# Patient Record
Sex: MALE | Race: WHITE | NOT HISPANIC OR LATINO | Employment: FULL TIME | URBAN - METROPOLITAN AREA
[De-identification: names, ages, dates, MRNs, and addresses within clinical notes are randomized per-mention and may not be internally consistent; named-entity substitution may affect disease eponyms.]

---

## 2021-09-30 ENCOUNTER — HOSPITAL ENCOUNTER (EMERGENCY)
Facility: CLINIC | Age: 36
Discharge: HOME | End: 2021-09-30
Attending: PHYSICIAN ASSISTANT | Admitting: PHYSICIAN ASSISTANT
Payer: COMMERCIAL

## 2021-09-30 VITALS
RESPIRATION RATE: 18 BRPM | SYSTOLIC BLOOD PRESSURE: 165 MMHG | HEART RATE: 100 BPM | OXYGEN SATURATION: 99 % | DIASTOLIC BLOOD PRESSURE: 102 MMHG | WEIGHT: 170 LBS | TEMPERATURE: 97 F

## 2021-09-30 DIAGNOSIS — Z20.2 EXPOSURE TO CHLAMYDIA: ICD-10-CM

## 2021-09-30 PROCEDURE — 99284 EMERGENCY DEPT VISIT MOD MDM: CPT | Performed by: PHYSICIAN ASSISTANT

## 2021-09-30 PROCEDURE — 99283 EMERGENCY DEPT VISIT LOW MDM: CPT | Performed by: PHYSICIAN ASSISTANT

## 2021-09-30 PROCEDURE — 87591 N.GONORRHOEAE DNA AMP PROB: CPT | Performed by: PHYSICIAN ASSISTANT

## 2021-09-30 PROCEDURE — 87491 CHLMYD TRACH DNA AMP PROBE: CPT | Performed by: PHYSICIAN ASSISTANT

## 2021-09-30 RX ORDER — DOXYCYCLINE 100 MG/1
100 CAPSULE ORAL EVERY 12 HOURS
Qty: 14 CAPSULE | Refills: 0 | Status: SHIPPED | OUTPATIENT
Start: 2021-09-30 | End: 2023-08-04

## 2021-10-01 ENCOUNTER — TELEPHONE (OUTPATIENT)
Dept: EMERGENCY MEDICINE | Facility: CLINIC | Age: 36
End: 2021-10-01

## 2021-10-01 LAB
C TRACH DNA SPEC QL NAA+PROBE: POSITIVE
N GONORRHOEA DNA SPEC QL NAA+PROBE: NEGATIVE

## 2021-10-01 NOTE — RESULT ENCOUNTER NOTE
Final N. Gonorrhoeae PCR is NEGATIVE.   No treatment or change in treatment per [facility] Lab Result N. Gonorrhea protocol.

## 2021-10-01 NOTE — ED PROVIDER NOTES
History     Chief Complaint   Patient presents with     Exposure to STD     HPI  Reg Nieves is a 35 year old male who presents for evaluation of positive chlamydia exposure.  Reports that his girlfriend of the past 4 months tested positive for chlamydia 2 days ago.  He denies any dysuria, frequency, urgency, flank pain, gross hematuria, urethral discharge, genital rash, penile pain, scrotal discomfort, nausea, vomiting, or abdominal pain.  Has never tested positive for an STD other than HPV 10 years ago.  Denies any ongoing genital lesions.  Has been in this relationship for the past 4 months.  He questions if maybe he had a positive exposure prior to this relationship.  Does admit to low back pain for the past 1 year, but has been better for the past 1 month.        Allergies:  No Known Allergies    Problem List:    There are no problems to display for this patient.       Past Medical History:    No past medical history on file.    Past Surgical History:    No past surgical history on file.    Family History:    No family history on file.    Social History:  Marital Status:  Single [1]  Social History     Tobacco Use     Smoking status: Current Every Day Smoker   Substance Use Topics     Alcohol use: Not on file     Drug use: Not on file        Medications:    doxycycline monohydrate (MONODOX) 100 MG capsule  NO ACTIVE MEDICATIONS  podofilox (CONDYLOX) 0.5 % external solution          Review of Systems   All other systems reviewed and are negative.      Physical Exam   BP: (!) 175/104  Pulse: 100  Temp: 97  F (36.1  C)  Resp: 18  Weight: 77.1 kg (170 lb)  SpO2: 99 %      Physical Exam  Vitals and nursing note reviewed.   Constitutional:       General: He is not in acute distress.     Appearance: He is not diaphoretic.   HENT:      Head: Normocephalic and atraumatic.      Right Ear: External ear normal.      Left Ear: External ear normal.      Nose: Nose normal.      Mouth/Throat:      Pharynx: No oropharyngeal  exudate.   Eyes:      General: No scleral icterus.        Right eye: No discharge.         Left eye: No discharge.      Conjunctiva/sclera: Conjunctivae normal.      Pupils: Pupils are equal, round, and reactive to light.   Neck:      Thyroid: No thyromegaly.   Cardiovascular:      Rate and Rhythm: Normal rate and regular rhythm.      Heart sounds: Normal heart sounds. No murmur heard.     Pulmonary:      Effort: Pulmonary effort is normal. No respiratory distress.      Breath sounds: Normal breath sounds. No wheezing or rales.   Chest:      Chest wall: No tenderness.   Abdominal:      General: Bowel sounds are normal. There is no distension.      Palpations: Abdomen is soft. There is no mass.      Tenderness: There is no abdominal tenderness. There is no guarding or rebound.   Genitourinary:     Penis: Normal.       Testes: Normal.   Musculoskeletal:         General: No tenderness or deformity. Normal range of motion.      Cervical back: Normal range of motion and neck supple.   Lymphadenopathy:      Cervical: No cervical adenopathy.   Skin:     General: Skin is warm and dry.      Capillary Refill: Capillary refill takes less than 2 seconds.      Findings: No erythema or rash.   Neurological:      Mental Status: He is alert and oriented to person, place, and time.      Cranial Nerves: No cranial nerve deficit.   Psychiatric:         Behavior: Behavior normal.         Thought Content: Thought content normal.         ED Course        Procedures              Critical Care time:  none               No results found for this or any previous visit (from the past 24 hour(s)).    Medications - No data to display    Assessments & Plan (with Medical Decision Making)  Exposure to chlamydia     35 year old male presents for evaluation of chlamydia exposure through his girlfriend.  He does not have any symptoms.  See HPI above for details.  On exam he is afebrile with temperature of 97.0.  Exam is normal.  Urine screening  pending.  Treatment with doxycycline per orders.  Possible side effects of medication discussed.  He will monitor MyChart for results.  I offered him hepatitis C, syphilis, and HIV screening.  He did not feel that this was necessary.  Encouraged him to follow-up with primary care in the event that he develops any symptoms or if he changes his mind about further screening.  Patient was in agreement this plan and was suitable for discharge.     I have reviewed the nursing notes.    I have reviewed the findings, diagnosis, plan and need for follow up with the patient.       New Prescriptions    DOXYCYCLINE MONOHYDRATE (MONODOX) 100 MG CAPSULE    Take 1 capsule (100 mg) by mouth every 12 hours       Final diagnoses:   Exposure to chlamydia     Disclaimer: This note consists of symbols derived from keyboarding, dictation and/or voice recognition software. As a result, there may be errors in the script that have gone undetected. Please consider this when interpreting information found in this chart.      9/30/2021   [facility] EMERGENCY DEPT     Mariusz Garcia PA-C  09/30/21 1787

## 2021-10-01 NOTE — TELEPHONE ENCOUNTER
[facility] Emergency Department/Urgent Care Lab result notification:    [place] ED lab result protocol used  Chlamydia T    Reason for call  Notify of lab results, assess symptoms,  review ED providers recommendations/discharge instructions (if necessary) and advise per ED lab result f/u protocol    Lab Result   Final Chlamydia trachomatis PCR on 10/1/21 is POSITIVE for C. trachomatis rRNA by transcription mediated amplification.   Antibiotic's administered while Patient in the [facility] Emergency Dept [if applicable]:  NA  [facility] ED discharge antibiotic[if applicable]: Doxycycline 100 mg PO tablet, 1 tablet (100 mg) by mouth 2 times daily for 7 days  Recommendations in treatment per [facility] ED Lab result Chlamydia trachomatis protocol.     Information table from Emergency Dept Provider visit on 9/30/21  Symptoms reported at ED visit (Chief complaint, HPI) Chief Complaint   Patient presents with     Exposure to STD      HPI  Reg Nieves is a 35 year old male who presents for evaluation of positive chlamydia exposure.  Reports that his girlfriend of the past 4 months tested positive for chlamydia 2 days ago.  He denies any dysuria, frequency, urgency, flank pain, gross hematuria, urethral discharge, genital rash, penile pain, scrotal discomfort, nausea, vomiting, or abdominal pain.  Has never tested positive for an STD other than HPV 10 years ago.  Denies any ongoing genital lesions.  Has been in this relationship for the past 4 months.  He questions if maybe he had a positive exposure prior to this relationship.  Does admit to low back pain for the past 1 year, but has been better for the past 1 month.     ED providers Impression and Plan (applicable information) Exposure to chlamydia      35 year old male presents for evaluation of chlamydia exposure through his girlfriend.  He does not have any symptoms.  See HPI above for details.  On exam he is afebrile with temperature of  97.0.  Exam is normal.  Urine screening pending.  Treatment with doxycycline per orders.  Possible side effects of medication discussed.  He will monitor MyChart for results.  I offered him hepatitis C, syphilis, and HIV screening.  He did not feel that this was necessary.  Encouraged him to follow-up with primary care in the event that he develops any symptoms or if he changes his mind about further screening.  Patient was in agreement this plan and was suitable for discharge.   Miscellaneous information NA     RN Assessment (Patient s current Symptoms), include time called.  [Insert Left message here if message left]  Patient was asymptomatic when seen in the ED    RN Recommendations/Instructions per [place] ED lab result protocol  Patient notified of lab result and treatment recommendations.   He has started the Doxycycline  STD Patient Instructions reviewed:    We recommend that you contact any recent sexual partners within the last 2 months and have them evaluated by a physician.    Avoid sexual activity for 7 to 10 days or until both you and your partner(s) have completed all antibiotic medications.    We advise that you consider following up with your PCP at approximately 3 months for retesting to be sure the infection has cleared.    PCP follow-up Questions asked: YES       Dante White RN  [facility]  Emergency Dept Lab Result RN  Ph# [phone]     Copy of Lab result   Component      Latest Ref Rng & Units 9/30/2021   N Gonorrhea PCR      Negative Negative   Chlamydia Trachomatis PCR      Negative Positive (A)

## 2021-10-01 NOTE — DISCHARGE INSTRUCTIONS
It was a pleasure working with you today!  I hope your condition improves rapidly!     Please take the doxycycline as prescribed twice daily for 7 days.  Please be careful with sun exposure while on this antibiotic, as you can burn much easier.  Your results should return in the next 3-4 days.  Please monitor MyChart for these results.  You were treated for presumed chlamydia.

## 2021-10-01 NOTE — RESULT ENCOUNTER NOTE
Final Chlamydia trachomatis PCR on 10/1/21 is POSITIVE for C. trachomatis rRNA by transcription mediated amplification.   Antibiotic's administered while Patient in the [facility] Emergency Dept [if applicable]:  NA  [facility] ED discharge antibiotic[if applicable]: Doxycycline 100 mg PO tablet, 1 tablet (100 mg) by mouth 2 times daily for 7 days  Recommendations in treatment per [facility] ED Lab result Chlamydia trachomatis protocol.

## 2021-11-12 ENCOUNTER — HOSPITAL ENCOUNTER (EMERGENCY)
Facility: CLINIC | Age: 36
Discharge: HOME | End: 2021-11-12
Attending: FAMILY MEDICINE | Admitting: FAMILY MEDICINE
Payer: COMMERCIAL

## 2021-11-12 VITALS
DIASTOLIC BLOOD PRESSURE: 91 MMHG | OXYGEN SATURATION: 99 % | HEART RATE: 103 BPM | TEMPERATURE: 98.7 F | SYSTOLIC BLOOD PRESSURE: 143 MMHG | WEIGHT: 168 LBS | RESPIRATION RATE: 20 BRPM

## 2021-11-12 DIAGNOSIS — Z20.822 SUSPECTED 2019 NOVEL CORONAVIRUS INFECTION: ICD-10-CM

## 2021-11-12 LAB
FLUAV RNA SPEC QL NAA+PROBE: NEGATIVE
FLUBV RNA RESP QL NAA+PROBE: NEGATIVE
SARS-COV-2 RNA RESP QL NAA+PROBE: NEGATIVE

## 2021-11-12 PROCEDURE — 99283 EMERGENCY DEPT VISIT LOW MDM: CPT

## 2021-11-12 PROCEDURE — 99282 EMERGENCY DEPT VISIT SF MDM: CPT | Performed by: FAMILY MEDICINE

## 2021-11-12 PROCEDURE — C9803 HOPD COVID-19 SPEC COLLECT: HCPCS

## 2021-11-12 PROCEDURE — 87636 SARSCOV2 & INF A&B AMP PRB: CPT | Performed by: FAMILY MEDICINE

## 2021-11-12 NOTE — ED TRIAGE NOTES
Pt reports a fever, cough, sore throat and some dizziness the past couple days. States he is feeling better with only a scratchy throat at this time. States he is here for a covid test so he can go back to work.

## 2021-11-12 NOTE — ED PROVIDER NOTES
History     Chief Complaint   Patient presents with     Pharyngitis     HPI  Reg Nivees is a 36 year old male who presents with concerns of possible COVID-19. Patient's had a cough, sore throat and fevers over the last few days, although he is feeling a little bit better today. Patient needs a note so that he can return to work. Denies any shortness of breath. Denies any chest pain. Nothing makes his symptoms better or worse.    Allergies:  No Known Allergies    Problem List:    There are no problems to display for this patient.       Past Medical History:    History reviewed. No pertinent past medical history.    Past Surgical History:    History reviewed. No pertinent surgical history.    Family History:    History reviewed. No pertinent family history.    Social History:  Marital Status:  Single [1]  Social History     Tobacco Use     Smoking status: Former Smoker     Smokeless tobacco: Never Used   Substance Use Topics     Alcohol use: Not Currently     Drug use: Never        Medications:    doxycycline monohydrate (MONODOX) 100 MG capsule  NO ACTIVE MEDICATIONS  podofilox (CONDYLOX) 0.5 % external solution          Review of Systems   All other systems reviewed and are negative.      Physical Exam   BP: (!) 143/91  Pulse: 103  Temp: 98.7  F (37.1  C)  Resp: 20  Weight: 76.2 kg (168 lb)  SpO2: 99 %      Physical Exam  Vitals and nursing note reviewed.   Constitutional:       General: He is not in acute distress.     Appearance: He is well-developed. He is not diaphoretic.   HENT:      Head: Normocephalic and atraumatic.   Eyes:      Conjunctiva/sclera: Conjunctivae normal.   Cardiovascular:      Rate and Rhythm: Normal rate and regular rhythm.      Heart sounds: Normal heart sounds. No murmur heard.      Pulmonary:      Effort: Pulmonary effort is normal. No respiratory distress.      Breath sounds: Normal breath sounds. No stridor. No wheezing.   Abdominal:      General: Bowel sounds are normal. There is no  distension.      Palpations: Abdomen is soft.      Tenderness: There is no abdominal tenderness. There is no guarding.   Musculoskeletal:         General: Normal range of motion.      Cervical back: Normal range of motion.   Skin:     General: Skin is warm and dry.      Findings: No rash.   Neurological:      Mental Status: He is alert and oriented to person, place, and time.   Psychiatric:         Judgment: Judgment normal.         ED Course        Procedures      No results found for this or any previous visit (from the past 24 hour(s)).    Medications - No data to display     Exam was unremarkable. We will do a Covid test and patient will quarantine at home till results come back hopefully later on today. Patient will be discharged at this time.    Assessments & Plan (with Medical Decision Making)  Suspected COVID-19 infection     I have reviewed the nursing notes.    I have reviewed the findings, diagnosis, plan and need for follow up with the patient.              11/12/2021   [facility] EMERGENCY DEPT     Fernando Liriano MD  11/12/21 0900

## 2021-12-16 ENCOUNTER — HOSPITAL ENCOUNTER (EMERGENCY)
Facility: CLINIC | Age: 36
Discharge: HOME | End: 2021-12-16
Attending: FAMILY MEDICINE | Admitting: FAMILY MEDICINE
Payer: COMMERCIAL

## 2021-12-16 VITALS
WEIGHT: 170 LBS | RESPIRATION RATE: 18 BRPM | HEIGHT: 72 IN | DIASTOLIC BLOOD PRESSURE: 103 MMHG | TEMPERATURE: 98.4 F | BODY MASS INDEX: 23.03 KG/M2 | HEART RATE: 88 BPM | SYSTOLIC BLOOD PRESSURE: 157 MMHG | OXYGEN SATURATION: 99 %

## 2021-12-16 DIAGNOSIS — R68.89 INFLUENZA-LIKE SYMPTOMS: ICD-10-CM

## 2021-12-16 PROCEDURE — 99284 EMERGENCY DEPT VISIT MOD MDM: CPT | Performed by: FAMILY MEDICINE

## 2021-12-16 PROCEDURE — 99282 EMERGENCY DEPT VISIT SF MDM: CPT | Performed by: FAMILY MEDICINE

## 2021-12-16 RX ORDER — ACETAMINOPHEN 500 MG
500-1000 TABLET ORAL EVERY 6 HOURS PRN
Refills: 0 | COMMUNITY
Start: 2021-12-16 | End: 2021-12-20

## 2021-12-16 RX ORDER — OSELTAMIVIR PHOSPHATE 75 MG/1
75 CAPSULE ORAL 2 TIMES DAILY
Qty: 10 CAPSULE | Refills: 0 | Status: SHIPPED | OUTPATIENT
Start: 2021-12-16 | End: 2021-12-21

## 2021-12-16 RX ORDER — IBUPROFEN 200 MG
600 TABLET ORAL EVERY 6 HOURS PRN
Refills: 0 | COMMUNITY
Start: 2021-12-16 | End: 2021-12-19

## 2021-12-16 ASSESSMENT — ENCOUNTER SYMPTOMS
COUGH: 1
FATIGUE: 1
SHORTNESS OF BREATH: 0
PSYCHIATRIC NEGATIVE: 1
SORE THROAT: 1
CARDIOVASCULAR NEGATIVE: 1
GASTROINTESTINAL NEGATIVE: 1
CHILLS: 1
HEMATOLOGIC/LYMPHATIC NEGATIVE: 1
MYALGIAS: 1
EYES NEGATIVE: 1
FEVER: 0
NEUROLOGICAL NEGATIVE: 1

## 2021-12-16 ASSESSMENT — MIFFLIN-ST. JEOR: SCORE: 1729.11

## 2021-12-16 NOTE — LETTER
[facility]  Emergency Room  [address] [place]   [address]  Tel: [phone]   Fax: [phone]  2021    Reg Nieves  [address]  [facility] [facility] [place] [address]  [phone] (home)     : 1985          To Whom it May Concern:    Reg Nieves was seen in our ER today 2021. He has been diagnosed with influenza and is considered contagious at this time.  I expect his condition to improve over the next 3-7 days.  He may return to work/school, without restriction, when improved and without fever for more than 24 hours. If not improved during the above expected time period,  then I have encouraged him to be rechecked in his clinic for further evaluation.      Please contact me for questions or concerns.    Sincerely,      Jimi Lopez, DO  Physician  [facility] Emergency Room

## 2021-12-17 NOTE — ED PROVIDER NOTES
History     Chief Complaint   Patient presents with     Influenza     HPI  Reg Nieves is a 36 year old male who presents to the ER with concerns about likely influenza infection.  Patient states that over the last 2 days he has developed nasal congestion, cough, and sore throat symptoms.  Mild body aches as well.  He has not noticed much of a fever as yet.  He states that 3 of his 8 children have tested positive for influenza and are currently ill at home.  He is concerned that he needs a note for work so as not to spread to his coworkers.  He states he has not been immunized for anything since he was a child that he is aware of.  He denies feeling significantly short of breath.  He has had no nausea or vomiting symptoms to this point.        I reviewed the following nurse triage note:  Pt states 3 days ago developed nasal congestion, sore throat, nausea, HA. Denies SOA or other sx. 3 of the patient's 8 children are influenza positive. All 3 have tested positive within the last 3 days. COVID negative. Pt states solely here for influenza testing this evening.      Allergies:  No Known Allergies    Problem List:    There are no problems to display for this patient.       Past Medical History:    No past medical history on file.    Past Surgical History:    No past surgical history on file.    Family History:    No family history on file.    Social History:  Marital Status:  Single [1]  Social History     Tobacco Use     Smoking status: Former Smoker     Smokeless tobacco: Never Used   Substance Use Topics     Alcohol use: Not Currently     Drug use: Never        Medications:    doxycycline monohydrate (MONODOX) 100 MG capsule  NO ACTIVE MEDICATIONS  podofilox (CONDYLOX) 0.5 % external solution          Review of Systems   Constitutional: Positive for chills and fatigue. Negative for fever.   HENT: Positive for congestion and sore throat.    Eyes: Negative.    Respiratory: Positive for cough. Negative for shortness  of breath.    Cardiovascular: Negative.    Gastrointestinal: Negative.    Genitourinary: Negative.    Musculoskeletal: Positive for myalgias.   Skin: Negative.    Neurological: Negative.    Hematological: Negative.    Psychiatric/Behavioral: Negative.    All other systems reviewed and are negative.      Physical Exam   BP: (!) 157/103  Pulse: 88  Temp: 98.4  F (36.9  C)  Resp: 18  Height: 182.9 cm (6')  Weight: 77.1 kg (170 lb)  SpO2: 99 %      Physical Exam  Vitals and nursing note reviewed.   Constitutional:       General: He is not in acute distress.     Appearance: He is ill-appearing. He is not toxic-appearing or diaphoretic.   HENT:      Head: Normocephalic and atraumatic.      Nose: Congestion present.      Mouth/Throat:      Mouth: Mucous membranes are moist.      Pharynx: Posterior oropharyngeal erythema present. No oropharyngeal exudate.   Eyes:      General: No scleral icterus.     Extraocular Movements: Extraocular movements intact.      Conjunctiva/sclera: Conjunctivae normal.      Pupils: Pupils are equal, round, and reactive to light.   Cardiovascular:      Rate and Rhythm: Normal rate.      Pulses: Normal pulses.   Pulmonary:      Effort: Pulmonary effort is normal. No respiratory distress.      Breath sounds: Normal breath sounds.   Musculoskeletal:         General: No swelling. Normal range of motion.      Cervical back: Normal range of motion and neck supple.   Skin:     Capillary Refill: Capillary refill takes less than 2 seconds.      Findings: No rash.   Neurological:      Mental Status: He is alert and oriented to person, place, and time.   Psychiatric:         Mood and Affect: Mood normal.         Behavior: Behavior normal.         ED Course                 Procedures              Critical Care time:  none               Assessments & Plan (with Medical Decision Making)  36-year-old male to the ER secondary concerns of symptoms of upper starter infection with exposure to 3 of his children  positive for influenza A with recent diagnosis.  Patient's exam findings consistent with upper respiratory viral type infection.  Have elected just to treat the patient given the history for likely influenza infection and he was prescribed Tamiflu.  Patient was also given a note for his employer.  Patient was instructed on signs and symptoms of concern with influenza-like illness and when to return to the ER if he has increase or worsening symptoms.  A handout was also sent home with him describing influenza and symptoms of concern.  Note was generated for his employer.  To return to the ER for increase or worsening symptoms as needed.     I have reviewed the nursing notes.    I have reviewed the findings, diagnosis, plan and need for follow up with the patient.       New Prescriptions    ACETAMINOPHEN (TYLENOL) 500 MG TABLET    Take 1-2 tablets (500-1,000 mg) by mouth every 6 hours as needed for fever or pain    IBUPROFEN (ADVIL/MOTRIN) 200 MG TABLET    Take 3 tablets (600 mg) by mouth every 6 hours as needed for fever (TAKE WITH FOOD) TAKE WITH FOOD AS NEEDED FOR PAIN    OSELTAMIVIR (TAMIFLU) 75 MG CAPSULE    Take 1 capsule (75 mg) by mouth 2 times daily for 5 days            I verbally discussed the findings of the evaluation today in the ER. I have verbally discussed with Reg the suggested treatment(s) as described in the discharge instructions and handouts. I have prescribed the above listed medications and instructed him on appropriate use of these medications.      I have verbally suggested he follow-up in his clinic or return to the ER for increased symptoms. See the follow-up recommendations documented  in the after visit summary in this visit's EPIC chart.    Final diagnoses:   Influenza-like symptoms       12/16/2021   [facility] EMERGENCY DEPT     Jimi Lopez,   12/16/21 2032

## 2021-12-17 NOTE — ED TRIAGE NOTES
Pt states 3 days ago developed nasal congestion, sore throat, nausea, HA. Denies SOA or other sx. 3 of the patient's 8 children are influenza positive. All 3 have tested positive within the last 3 days. COVID negative. Pt states solely here for influenza testing this evening.

## 2021-12-18 ENCOUNTER — HEALTH MAINTENANCE LETTER (OUTPATIENT)
Age: 36
End: 2021-12-18

## 2022-01-03 ENCOUNTER — HOSPITAL ENCOUNTER (EMERGENCY)
Facility: CLINIC | Age: 37
Discharge: HOME | End: 2022-01-03
Attending: EMERGENCY MEDICINE | Admitting: EMERGENCY MEDICINE
Payer: COMMERCIAL

## 2022-01-03 VITALS
DIASTOLIC BLOOD PRESSURE: 114 MMHG | HEART RATE: 105 BPM | SYSTOLIC BLOOD PRESSURE: 139 MMHG | HEIGHT: 72 IN | RESPIRATION RATE: 20 BRPM | OXYGEN SATURATION: 97 % | WEIGHT: 174.8 LBS | BODY MASS INDEX: 23.68 KG/M2

## 2022-01-03 DIAGNOSIS — Z20.822 EXPOSURE TO 2019 NOVEL CORONAVIRUS: ICD-10-CM

## 2022-01-03 PROCEDURE — 87636 SARSCOV2 & INF A&B AMP PRB: CPT | Performed by: EMERGENCY MEDICINE

## 2022-01-03 PROCEDURE — C9803 HOPD COVID-19 SPEC COLLECT: HCPCS | Performed by: EMERGENCY MEDICINE

## 2022-01-03 PROCEDURE — 99283 EMERGENCY DEPT VISIT LOW MDM: CPT | Performed by: EMERGENCY MEDICINE

## 2022-01-03 ASSESSMENT — MIFFLIN-ST. JEOR: SCORE: 1750.89

## 2022-01-03 NOTE — ED NOTES
Discussed pt's high blood pressure talked about contributing factors and risks.  Discussed decreasing caffeine use, smoking and making an appointment with a General Provider

## 2022-01-03 NOTE — ED PROVIDER NOTES
History     Chief Complaint   Patient presents with     Covid Concern     WAS EXPOSED TO COVID     HPI  Reg Nieves is a 36 year old male who is requesting testing for both influenza and Covid.  Exposed to influenza A with children at home in the past week.  Exposure to Covid through a coworker.  They share a very small workspace is about 6 x 8 coworkers been sick for days and family went in for testing to confirm positive.  Patient has no risk factors.  He is not vaccinated.  States he does not feel short of breath.  Has had fever, chills, night sweats myalgias and headache.  Symptoms been present for the last 2 days.    Allergies:  No Known Allergies    Problem List:    There are no problems to display for this patient.       Past Medical History:    No past medical history on file.    Past Surgical History:    No past surgical history on file.    Family History:    No family history on file.    Social History:  Marital Status:  Single [1]  Social History     Tobacco Use     Smoking status: Former Smoker     Smokeless tobacco: Never Used   Substance Use Topics     Alcohol use: Not Currently     Drug use: Never        Medications:    podofilox (CONDYLOX) 0.5 % external solution  doxycycline monohydrate (MONODOX) 100 MG capsule  NO ACTIVE MEDICATIONS          Review of Systems   All other systems reviewed and are negative.      Physical Exam   BP: (!) 149/114  Pulse: 112  Resp: 20  Height: 182.9 cm (6')  Weight: 79.3 kg (174 lb 12.8 oz)  SpO2: 100 %      Physical Exam  Vitals and nursing note reviewed.   Constitutional:       Appearance: He is not ill-appearing.   HENT:      Head: Normocephalic.      Nose: Nose normal.   Eyes:      Conjunctiva/sclera: Conjunctivae normal.   Cardiovascular:      Rate and Rhythm: Normal rate.   Pulmonary:      Effort: Pulmonary effort is normal. No respiratory distress.      Breath sounds: Normal breath sounds. No stridor. No wheezing or rales.   Chest:      Chest wall: No  tenderness.   Skin:     General: Skin is warm.      Capillary Refill: Capillary refill takes less than 2 seconds.      Findings: No rash.   Neurological:      General: No focal deficit present.      Mental Status: He is alert and oriented to person, place, and time.   Psychiatric:         Mood and Affect: Mood normal.         Behavior: Behavior normal.         ED Course                 Procedures             Assessments & Plan (with Medical Decision Making) patient presents requesting testing for influenza and Covid.  Exposure to both.  Exposed to children with influenza in the home over the last week and exposed to coworkers been sick over the last week and finally chose to go to get tested and confirmed positive.  Patient was the weekend and developed fever, chills, night sweats, headache and body ache.  He is not vaccinated for Covid.  Did not receive his influenza vaccine.  Patient requesting to go home and get his results from my chart.     I have reviewed the nursing notes.    I have reviewed the findings, diagnosis, plan and need for follow up with the patient.      New Prescriptions    No medications on file       Final diagnoses:   Exposure to 2019 novel coronavirus       1/3/2022   [facility] EMERGENCY DEPT     Alpesh Salinas,   01/03/22 0928

## 2022-01-03 NOTE — DISCHARGE INSTRUCTIONS
Results for influenza A, influenza B, Covid PCR should be available later today in your MyChart.  If positive follow CDC guidelines.  Presented back to the emergency department if you start having respiratory difficulties.

## 2022-01-03 NOTE — ED TRIAGE NOTES
Pt family tested +Influenza about two weeks ago.  Pt was exposed to covid + co-worker on Wednesday.  Pt has developed vommitting diarrhea and fever symptoms over the last 24+hrs.

## 2022-02-03 ENCOUNTER — VIRTUAL VISIT (OUTPATIENT)
Dept: FAMILY MEDICINE | Facility: CLINIC | Age: 37
End: 2022-02-03
Payer: COMMERCIAL

## 2022-02-03 DIAGNOSIS — F41.9 ANXIETY: Primary | ICD-10-CM

## 2022-02-03 PROCEDURE — 99204 OFFICE O/P NEW MOD 45 MIN: CPT | Mod: GT | Performed by: FAMILY MEDICINE

## 2022-02-03 PROCEDURE — 96127 BRIEF EMOTIONAL/BEHAV ASSMT: CPT | Mod: 59 | Performed by: FAMILY MEDICINE

## 2022-02-03 RX ORDER — HYDROXYZINE HYDROCHLORIDE 10 MG/1
10 TABLET, FILM COATED ORAL 3 TIMES DAILY PRN
Qty: 90 TABLET | Refills: 0 | Status: SHIPPED | OUTPATIENT
Start: 2022-02-03 | End: 2023-08-02

## 2022-02-03 RX ORDER — ESCITALOPRAM OXALATE 10 MG/1
10 TABLET ORAL DAILY
Qty: 90 TABLET | Refills: 0 | Status: SHIPPED | OUTPATIENT
Start: 2022-02-03 | End: 2023-08-04 | Stop reason: DRUGHIGH

## 2022-02-03 ASSESSMENT — ANXIETY QUESTIONNAIRES
1. FEELING NERVOUS, ANXIOUS, OR ON EDGE: NEARLY EVERY DAY
6. BECOMING EASILY ANNOYED OR IRRITABLE: NEARLY EVERY DAY
3. WORRYING TOO MUCH ABOUT DIFFERENT THINGS: NEARLY EVERY DAY
GAD7 TOTAL SCORE: 21
2. NOT BEING ABLE TO STOP OR CONTROL WORRYING: NEARLY EVERY DAY
IF YOU CHECKED OFF ANY PROBLEMS ON THIS QUESTIONNAIRE, HOW DIFFICULT HAVE THESE PROBLEMS MADE IT FOR YOU TO DO YOUR WORK, TAKE CARE OF THINGS AT HOME, OR GET ALONG WITH OTHER PEOPLE: SOMEWHAT DIFFICULT
5. BEING SO RESTLESS THAT IT IS HARD TO SIT STILL: NEARLY EVERY DAY
7. FEELING AFRAID AS IF SOMETHING AWFUL MIGHT HAPPEN: NEARLY EVERY DAY

## 2022-02-03 ASSESSMENT — ENCOUNTER SYMPTOMS
NERVOUS/ANXIOUS: 1
GASTROINTESTINAL NEGATIVE: 1
NEUROLOGICAL NEGATIVE: 1
CONSTITUTIONAL NEGATIVE: 1
CARDIOVASCULAR NEGATIVE: 1
EYES NEGATIVE: 1
ENDOCRINE NEGATIVE: 1
DECREASED CONCENTRATION: 1
SLEEP DISTURBANCE: 1
RESPIRATORY NEGATIVE: 1
HEMATOLOGIC/LYMPHATIC NEGATIVE: 1

## 2022-02-03 ASSESSMENT — PATIENT HEALTH QUESTIONNAIRE - PHQ9
5. POOR APPETITE OR OVEREATING: NEARLY EVERY DAY
SUM OF ALL RESPONSES TO PHQ QUESTIONS 1-9: 17

## 2022-02-03 NOTE — PROGRESS NOTES
Reg is a 36 year old who is being evaluated via a billable video visit.      How would you like to obtain your AVS? Mail a copy  If the video visit is dropped, the invitation should be resent by: Text to cell phone: [phone] send link to # listed  Will anyone else be joining your video visit? No      Video Start Time: 16:36 PM    Assessment & Plan     Anxiety  Medication faxed,recommend getting into detox so he is eligible for alcohol rehab.   - escitalopram (LEXAPRO) 10 MG tablet; Take 1 tablet (10 mg) by mouth daily Patient can increase to 2 tabs after 1-2 weeks  - hydrOXYzine (ATARAX) 10 MG tablet; Take 1 tablet (10 mg) by mouth 3 times daily as needed for anxiety     :[id number]     Depression Screening Follow Up    PHQ 2/3/2022   PHQ-9 Total Score 17   Q9: Thoughts of better off dead/self-harm past 2 weeks Not at all     Last PHQ-9 2/3/2022   1.  Little interest or pleasure in doing things 3   2.  Feeling down, depressed, or hopeless 3   3.  Trouble falling or staying asleep, or sleeping too much 3   4.  Feeling tired or having little energy 2   5.  Poor appetite or overeating 0   6.  Feeling bad about yourself 3   7.  Trouble concentrating 0   8.  Moving slowly or restless 3   Q9: Thoughts of better off dead/self-harm past 2 weeks 0   PHQ-9 Total Score 17   Difficulty at work, home, or with people Somewhat difficult       Follow Up Actions Taken  Crisis resource information provided in After Visit Summary     FUTURE APPOINTMENTS:       - Follow-up visit in one month or sooner as needed.    Return in about 4 weeks (around 3/3/2022).    Chucky Tsang MD  [facility]    Morro Finney is a 36 year old who presents for the following health issues     HPI   Patient is a 36-year-old male here for depression and anxiety . He has had this for many years.  He has been tried on fluoxetine in the past and states that it was not effective.      He also has a severe alcohol  dependence and is seeking some help.  He is requesting Antabuse for alcohol dependence.      He reports that he is in the process of getting into a alcohol rehab center but he was told that he should try to get this medication so that he can stop taking alcohol.  I explained to the patient that this is a sensitive medication prescribed only if patients have abstained from alcohol.     It will be risky to prescribe especially if he is still abusing alcohol.     He was seen in the emergency room on 1/27/2022 for alcohol intoxication there is no way to verify the patient has completely stopped taking alcohol .    Recommended seeing an addiction medicine specialist.        We discussed his depression and anxiety.  Reports the anxiety is severe about a 7 out of 10 in severity.  He said he had been on medication in the past but did not think it helped.  He is open to trying something else.  No other complaints today.      Wants to discuss Antabuse to stop using alcohol  Abnormal Mood Symptoms  Onset/Duration: ongoing for 8 years  Description:  Depression (if yes, do PHQ-9): YES  Anxiety (if yes, do WILFRIDO-7): YES  Accompanying Signs & Symptoms:  Still participating in activities that you used to enjoy: YES  Fatigue: no  Irritability: YES  Difficulty concentrating: YES- sometimes  Changes in appetite: no  Problems with sleep: YES- waking up at 3AM multiple times a week and has trouble falling back asleep  Heart racing/beating fast: no  Abnormally elevated, expansive, or irritable mood: YES  Persistently increased activity or energy: no  Thoughts of hurting yourself or others: no  History:  Recent stress or major life event: YES- broke up with GF, grandma had a stroke a few months ago and lost his mother a few years ago  Prior depression or anxiety: yes  Family history of depression or anxiety: YES- it runs in his family but typically on the female   Alcohol/drug use: YES- alcohol, up to 10 beers in a night, never less than 6  beers a day - will be going to treatment soon for alcohol  Difficulty sleeping: no  Precipitating or alleviating factors: just using alcohol, and ice fishing makes him happy and relaxed  Therapies tried and outcome: was using fluoxetine for mood some time ago but didn't think it helped him and stopped taking all medications.      WILFRIDO-7 SCORE 2/3/2022   Total Score 21     PHQ-9 score:    PHQ 2/3/2022   PHQ-9 Total Score 17   Q9: Thoughts of better off dead/self-harm past 2 weeks Not at all       Hypertension Follow-up      Do you check your blood pressure regularly outside of the clinic? No - but last time he did 140/116    Are you following a low salt diet? No    Are your blood pressures ever more than 140 on the top number (systolic) OR more   than 90 on the bottom number (diastolic), for example 140/90? Yes        Review of Systems   Constitutional: Negative.    HENT: Negative.    Eyes: Negative.    Respiratory: Negative.    Cardiovascular: Negative.    Gastrointestinal: Negative.    Endocrine: Negative.    Genitourinary: Negative.    Neurological: Negative.    Hematological: Negative.    Psychiatric/Behavioral: Positive for behavioral problems, decreased concentration, mood changes, sleep disturbance and suicidal ideas. The patient is nervous/anxious.             Objective           Vitals:  No vitals were obtained today due to virtual visit.    Physical Exam   GENERAL: Healthy, alert and no distress  EYES: Eyes grossly normal to inspection.  No discharge or erythema, or obvious scleral/conjunctival abnormalities.  RESP: No audible wheeze, cough, or visible cyanosis.  No visible retractions or increased work of breathing.    SKIN: Visible skin clear. No significant rash, abnormal pigmentation or lesions.  PSYCH: Mentation appears normal, affect flat, judgement and insight intact, normal speech and appearance well-groomed.            Video-Visit Details    Type of service:  Video Visit    Video End Time:16:36  PM    Originating Location (pt. Location): Home    Distant Location (provider location):  [facility]     Platform used for Video Visit: Tiffany

## 2022-02-04 ASSESSMENT — ANXIETY QUESTIONNAIRES: GAD7 TOTAL SCORE: 21

## 2022-02-24 ENCOUNTER — TELEPHONE (OUTPATIENT)
Dept: BEHAVIORAL HEALTH | Facility: CLINIC | Age: 37
End: 2022-02-24
Payer: COMMERCIAL

## 2022-02-28 ENCOUNTER — TELEPHONE (OUTPATIENT)
Dept: BEHAVIORAL HEALTH | Facility: CLINIC | Age: 37
End: 2022-02-28

## 2022-02-28 NOTE — TELEPHONE ENCOUNTER
Spoke with patient to check them in for their virtual [facility] eval today, 2/28/2022 at 1500. Patient asked to reschedule the appointment, so their new [facility] eval is schedule for Friday, 3/4/2022 at 1300 with Toya. Patient is not check in for today's appointment,  notified.

## 2022-02-28 NOTE — TELEPHONE ENCOUNTER
Patient have a video appointment today at 3pm. Writer placed a call this afternoon to check in patient. Unable to get a hold of patient. Writer left a voicemail with writer's call back number. If patient does not respond, writer will place a second call.

## 2022-03-02 ENCOUNTER — TELEPHONE (OUTPATIENT)
Dept: BEHAVIORAL HEALTH | Facility: CLINIC | Age: 37
End: 2022-03-02
Payer: COMMERCIAL

## 2022-03-04 ENCOUNTER — HOSPITAL ENCOUNTER (OUTPATIENT)
Dept: BEHAVIORAL HEALTH | Facility: CLINIC | Age: 37
Discharge: HOME | End: 2022-03-04
Attending: FAMILY MEDICINE | Admitting: FAMILY MEDICINE
Payer: COMMERCIAL

## 2022-03-04 VITALS — HEIGHT: 72 IN | BODY MASS INDEX: 23.03 KG/M2 | WEIGHT: 170 LBS

## 2022-03-04 PROCEDURE — H0001 ALCOHOL AND/OR DRUG ASSESS: HCPCS | Mod: 95 | Performed by: COUNSELOR

## 2022-03-04 ASSESSMENT — COLUMBIA-SUICIDE SEVERITY RATING SCALE - C-SSRS
1. IN YOUR LIFETIME, HAVE YOU WISHED YOU WERE DEAD OR WISHED YOU COULD GO TO SLEEP AND NOT WAKE UP?: YEARS AGO
TOTAL  NUMBER OF ABORTED OR SELF INTERRUPTED ATTEMPTS LIFETIME: NO
ATTEMPT LIFETIME: NO
2. HAVE YOU ACTUALLY HAD ANY THOUGHTS OF KILLING YOURSELF?: NO
1. IN THE PAST MONTH, HAVE YOU WISHED YOU WERE DEAD OR WISHED YOU COULD GO TO SLEEP AND NOT WAKE UP?: NO
1. HAVE YOU WISHED YOU WERE DEAD OR WISHED YOU COULD GO TO SLEEP AND NOT WAKE UP?: YES
TOTAL  NUMBER OF INTERRUPTED ATTEMPTS LIFETIME: NO
6. HAVE YOU EVER DONE ANYTHING, STARTED TO DO ANYTHING, OR PREPARED TO DO ANYTHING TO END YOUR LIFE?: NO

## 2022-03-04 ASSESSMENT — ANXIETY QUESTIONNAIRES
GAD7 TOTAL SCORE: 3
2. NOT BEING ABLE TO STOP OR CONTROL WORRYING: SEVERAL DAYS
7. FEELING AFRAID AS IF SOMETHING AWFUL MIGHT HAPPEN: NOT AT ALL
6. BECOMING EASILY ANNOYED OR IRRITABLE: SEVERAL DAYS
3. WORRYING TOO MUCH ABOUT DIFFERENT THINGS: NOT AT ALL
1. FEELING NERVOUS, ANXIOUS, OR ON EDGE: SEVERAL DAYS
5. BEING SO RESTLESS THAT IT IS HARD TO SIT STILL: NOT AT ALL
4. TROUBLE RELAXING: NOT AT ALL

## 2022-03-04 ASSESSMENT — PAIN SCALES - GENERAL: PAINLEVEL: NO PAIN (0)

## 2022-03-04 ASSESSMENT — PATIENT HEALTH QUESTIONNAIRE - PHQ9: SUM OF ALL RESPONSES TO PHQ QUESTIONS 1-9: 6

## 2022-03-04 NOTE — PROGRESS NOTES
"[facility]  Provider Name:  Taty Yanez MA [facility]  Provider Phone Number: [phone]    PATIENT'S NAME: Reg Nieves  PREFERRED NAME: Reg  PRONOUNS: he/him/his     MRN: [id number]  : 1985  ADDRESS: [address] [place] [address]  ACCT. NUMBER:  [id number]  INSURANCE PROVIDER: BETTY richmond/[employer]  DATE OF SERVICE: 3/04/22  START TIME: 1:01pm  END TIME: 1:54pm  PREFERRED PHONE: [phone]  May we leave a program related message: Yes  SERVICE MODALITY:  Video Visit:      Provider verified identity through the following two step process.  Patient provided:  Patient  and Patient's last 4 digits of SSN    Telemedicine Visit: The patient's condition can be safely assessed and treated via synchronous audio and visual telemedicine encounter.      Reason for Telemedicine Visit: Patient has requested telehealth visit    Originating Site (Patient Location): Patient's home    Distant Site (Provider Location): Provider Remote Setting- Home Office    Consent:  The patient/guardian has verbally consented to: the potential risks and benefits of telemedicine (video visit) versus in person care; bill my insurance or make self-payment for services provided; and responsibility for payment of non-covered services.     Patient would like the video invitation sent by:  Send to e-mail at: [email or web address]    Mode of Communication:  Video Conference via [facility]    As the provider I attest to compliance with applicable laws and regulations related to telemedicine.    UNIVERSAL ADULT Substance Use Disorder DIAGNOSTIC ASSESSMENT    Identifying Information:  Patient is a 36 year old, . The pronoun use throughout this assessment reflects the patient's chosen pronoun.  Patient was referred for an assessment by self.  Patient attended the session alone.    Chief Complaint:   The reason for seeking services at this time is: \"It is for a court situation, it " "is not court ordered. I haven't been to court yet. My  wanted me to get it done ahead of time.\" He reports a month ago he was charged with a domestic abuse charge with his now ex-girlfriend while he was under the influence of alcohol. The problem(s) began with alcohol a few years ago. Patient has not attempted to resolve these concerns in the past.  Patient does not appear to be in severe withdrawal, an imminent safety risk to self or others, or requiring immediate medical attention and may proceed with the assessment interview.    Social/Family History:  Patient reported they grew up in [place], [place].  They were raised by their grandparents. Patient reported that their childhood was \"really good. Being an only child. I got spoiled. My grandpa taught me how to fish and hunt. He did everything with me.\" He stated, \"my mom was involved, but she worked two jobs. I would stay with\" his grandparents during the week and on the weekends he would stay with his mom and stepdad. Patient describes current relationships with family of origin as \"It's kind of distant. Not for any reason. Since my grandpa passed away, we don't get together for Caterina.\"      The patient describes their cultural background as .  Cultural influences and impact on patient's life structure, values, norms, and healthcare: NA.  Contextual influences on patient's health include: NA.  Patient identified their preferred language to be English. Patient reported they do not need the assistance of an  or other support involved in therapy.  Patient reports they are not involved in community of montserrat activities.  They reports spirituality impacts recovery in the following ways: \"I am kind of in the middle. I fully believe in God and believe in the Bible, yet I don't go to Orthodoxy every Sunday.\"     Patient reported had no significant delays in developmental tasks. Patient's highest education level was 9th grade. Patient " "identified the following learning problems: none reported.  Patient reports they are able to understand written materials.    Patient reported the following relationship history: the longest relationship he was in was 5 years.  Patient's current relationship status is single.   Patient identified their sexual orientation as heterosexual.  Patient reported having three children- ages 13, 6, and 5.  He has 50/50 custody of the kids. Every two days he gets his kids and he has them every other weekend.    Patient's current living/housing situation involves staying with his grandma.  They live with their grandma and their kids and they report that housing is stable. Patient identified friends as part of their support system.  Patient identified the quality of these relationships as stable and meaningful.      Patient reports engaging in the following recreational/leisure activities: \"I am obsessed with fishing.\" He has been ice fishing for the last 6 weekends. In the summer, he goes cat fishing. Patient is currently employed full time and reports they are able to function appropriately at work. He has been at his job for the past 17 years.  Patient reports their income is obtained through employment.  Patient does not identify finances as a current stressor.      Patient reports the following substance related arrests or legal issues: pending Domestic Assault charge.  He reports he was under the influence at the time of the offense (1/7/2022) Patient denies being on probation / parole / under the jurisdiction of the court. He has court in April 2022.  :  Jason Lugo  [phone]    Patient's Strengths and Limitations:  Patient identified the following strengths or resources that will help them succeed in treatment: montserrat / spirituality, friends / good social support and family support. Things that may interfere with the patient's success in treatment include: none identified.     Assessments:  The following " assessments were completed by patient for this visit:  PHQ9:   PHQ-9 SCORE 2/3/2022 3/4/2022   PHQ-9 Total Score 17 6     GAD7:   WILFRIDO-7 SCORE 2/3/2022 3/4/2022   Total Score 21 3     [place] Suicide Severity Rating Scale (Lifetime/Recent)  [place] Suicide Severity Rating (Lifetime/Recent) 3/4/2022   1. Wish to be Dead (Lifetime) 1   Wish to be Dead Description (Lifetime) years ago   1. Wish to be Dead (Past 1 Month) 0   2. Non-Specific Active Suicidal Thoughts (Lifetime) 0   Actual Attempt (Lifetime) 0   Has subject engaged in non-suicidal self-injurious behavior? (Lifetime) 0   Interrupted Attempts (Lifetime) 0   Aborted or Self-Interrupted Attempt (Lifetime) 0   Preparatory Acts or Behavior (Lifetime) 0   Calculated C-SSRS Risk Score (Lifetime/Recent) No Risk Indicated     GAIN-sliding scale:  When was the last time that you had significant problems... 3/4/2022   with feeling very trapped, lonely, sad, blue, depressed or hopeless about the future? Past month   with sleep trouble, such as bad dreams, sleeping restlessly, or falling asleep during the day? Past Month   with feeling very anxious, nervous, tense, scared, panicked or like something bad was going to happen? Past month   with becoming very distressed & upset when something reminded you of the past? Never   with thinking about ending your life or committing suicide? 1+ years ago      When was the last time that you did the following things 2 or more times? 3/4/2022   Lied or conned to get things you wanted or to avoid having to do something? Never   Had a hard time paying attention at school, work or home? Never   Had a hard time listening to instructions at school, work or home? 1+ years ago   Were a bully or threatened other people? Never   Started physical fights with other people? Never       Personal and Family Medical History:  Patient did report a family history of mental health concerns.  Patient reports family history includes Anxiety Disorder  "in his mother; Depression in his mother; Substance Abuse in his mother.    Patient reported the following previous mental health diagnoses: \"depression and anxiety.\"  Patient reports their primary mental health symptoms include: depression and anxiety and these do not impact his ability to function. Patient has not received mental health services in the past.  Psychiatric Hospitalizations: None.  Patient denies a history of civil commitment.  Current mental health services/providers include: medications.    Patient has had a physical exam to rule out medical causes for current symptoms.  Date of last physical exam was within the past year. Client was encouraged to follow up with PCP if symptoms were to develop. The patient has a [facility] Provider, who is named Dr. Chucky Tsang.  Patient reports no current medical concerns and the following current dental concerns: \"I have bad teeth.\" Patient denies any issues with pain. There are not significant appetite / nutritional concerns / weight changes.  Patient does not report a history of an eating disorder.  Patient does not report a history of head injury / trauma / cognitive impairment.      Patient reports current meds as:   Outpatient Medications Marked as Taking for the 3/4/22 encounter (Hospital Encounter) with Taty Clayton LADC   Medication Sig     doxycycline monohydrate (MONODOX) 100 MG capsule Take 1 capsule (100 mg) by mouth every 12 hours     escitalopram (LEXAPRO) 10 MG tablet Take 1 tablet (10 mg) by mouth daily Patient can increase to 2 tabs after 1-2 weeks     hydrOXYzine (ATARAX) 10 MG tablet Take 1 tablet (10 mg) by mouth 3 times daily as needed for anxiety     Medication Adherence:  Patient reports taking prescribed medications as prescribed.    Patient Allergies:  No Known Allergies    Medical History:  History reviewed. No pertinent past medical history.    Substance Use:  Patient reported no family history of chemical " "health issues.  Patient has not received substance use disorder and/or gambling treatment in the past.  Patient has not been to detox.  Patient is not currently receiving any chemical dependency treatment. Patient reports they currently attend the following support groups: AA through Facebook. He is intereseted in attending AA meetings.      Substance Age of first use Pattern and duration of use (include amounts and frequency) Date of last use     Withdrawal potential Route of administration   has used Alcohol 19/20 HU: past 4 years. Daily use of 6-10 Coors Light beers.     2.5 weeks ago no oral   has not used Marijuana        has not used Amphetamines        has not used Cocaine/crack         has not used Hallucinogens        has not used Inhalants        has not used Heroin        has not used Other Opiates        has not used Benzodiazepine        has not used Barbiturates        has not used Over the counter meds.        has use Caffeine elementary age Dr Girard: 2-3 20oz bottles and 1-2 bottles in the evening.   3/4/22 no oral   has used Nicotine  31 vapes daily 3/4/22 no smoke   has not used other substances not listed above:  Identify:             Patient reported the following problems as a result of their substance use: legal issues.  Patient first became concerned about their substance use \"I would say a few years ago.\" Patient reports \"my grandma\" is concerned about their substance use.  Patient reports their recovery goals are \"I just want to quit completely. I am afraid\" that he cannot safely have just two beers.     Patient reports experiencing the following withdrawal symptoms within the past 12 months: headache, fatigue and anxiety/worry and the following within the past 30 days: headache, fatigue and anxiety/worry. Patient reports urges/cravings to use Alcohol as a 2-3/10 in the past week.  Patient reports he has not used more Alcohol than intended or over a longer period of time than intended. " "Patient reports he has not had unsuccessful attempts to cut down or control use of Alcohol.  Patient reports longest period of abstinence was a few days here and there and return to use was due to needing to relax and took the edge off of his anxiety. Patient reports he has needed to use more Alcohol to achieve the same effect.  Patient does report diminished effect with use of same amount of Alcohol.     Patient does report a great deal of time is spent in activities necessary to obtain, use, or recover from Alcohol effects.  Patient does not report important social, occupational, or recreational activities are given up or reduced because of Alcohol use.  Alcohol use is continued despite knowledge of having a persistent or recurrent physical or psychological problem that is likely to have caused or exacerbated by use.  Patient reports the following problem behaviors while under the influence of substances: none reported.     Patient reports substance use has not ever impacted their ability to function in a school setting. Patient reports substance use has ever impacted their ability to function in a work setting.  Patient's demographics and history impact their recovery in the following ways:  Supportive family and motivation.  Patient reports engaging in the following recreation/leisure activities while using: \"sat on the couch and watched tv.\"  Patient reports the following people are supportive of recovery: family.    Patient does not have a history of gambling concerns and/or treatment.  Patient does not have other addictive behaviors he is concerned about.        Dimension Scale Ratings:    Dimension 1 -  Acute Intoxication/Withdrawal: 0 - No Problem Pt reports his last use of alcohol was 2.5 weeks ago.  Dimension 2 - Biomedical: 0 - No Problem Pt reports no medical concerns.  Dimension 3 - Emotional/Behavioral/Cognitive Conditions: 1 - Minor Problem Pt reports a mental health diagnosis of depression and " "anxiety. He reports his anxiety is well controlled with his current medication.  Dimension 4 - Readiness to Change:  0 - No Problem Pt reports being motivated to remain abstient from alcohol.  Dimension 5 - Relapse/Continued Use/ Continued Problem Potential: 2 - Moderate Problem Pt has never attended a CONRADO treatment before. He has been in contact with an AA group via Facebook.  Dimension 6 - Recovery Environment:  1 - Minor Problem Pt reports working full time and has been with his company for the past 17 years.    Significant Losses / Trauma / Abuse / Neglect Issues:   Patient did not serve in the .  There are indications or report of significant loss, trauma, abuse or neglect issues related to: \"My grandpa passed away in '04. My mom passed away unexpectedly 3 years ago. My grandma had a stroke last summer.\" Pt reports no history of abuse.  Concerns for possible neglect are not present.     Safety Assessment:   Patient denies current homicidal ideation and behaviors.  Patient denies current self-injurious ideation and behaviors.    Patient denied risk behaviors associated with substance use.  Patient reported substance use associated with mental health symptoms.  Patient reports the following current concerns for their personal safety: None.  Patient reports there are no firearms in the house.           History of Safety Concerns:  Patient denied a history of homicidal ideation.     Patient denied a history of personal safety concerns.    Patient denied a history of assaultive behaviors.    Patient denied a history of sexual assault behaviors.     Patient denied a history of risk behaviors associated with substance use.  Patient reported a history of substance use associated with mental health symptoms.  Patient reports the following protective factors: spirituality, housing stability, children, hobbies.    Risk Plan:  See Recommendations for Safety and Risk Management Plan    Review of Symptoms per patient " report:  Substance Use:  hangovers, daily use, family relationship problems due to substance use and cravings/urges to use     Collateral Contact Summary:   Collateral contacts contributing to this assessment:    [facility] EMR:  The patient's medical record at [facility] was reviewed and the information contained in the medical record supported the patient's account of his chemical use history and chemical use consequences.    If court related records were reviewed, summarize here: NA    Information from collateral contacts supported/largely agreed with information from the client and associated risk ratings.    Information in this assessment was obtained from the medical record and provided by patient who is a good historian.    Patient will have open access to their mental health medical record.    Diagnostic Criteria:  3.) A great deal of time is spent in activities necessary to obtain alcohol, use alcohol, or recover from its effects.  Met for Alcohol.  4.) Craving, or a strong desire or urge to use alcohol/drug.  Met for Alcohol.  6.) Continued alcohol use despite having persistent or recurrent social or interpersonal problems caused or exacerbated by the effects of alcohol/drug.  Met for Alcohol.  9.) Alcohol/drug use is continued despite knowledge of having a persistent or recurrent physical or psychological problem that is likely to have been caused or exacerbated by alcohol.  Met for Alcohol.  10.) Tolerance, as defined by either of the following: A need for markedly increased amounts of alcohol/drug to achieve intoxication or desired effect..  Met for Alcohol.  11.) Withdrawal, as manifested by either of the following: The characteristic withdrawal syndrome for alcohol/drug (refer to Criteria A and B of the criteria set for alcohol/drug withdrawal).. Met for Alcohol.       As evidenced by self report and criteria, client meets the following DSM5 Diagnoses:   (Sustained by DSM5 Criteria  Listed Above)    Category of Substance Severity (ICD-10 Code / DSM 5 Code)     Alcohol Use Disorder Severe  (10.20) (303.90)   Cannabis Use Disorder The patient does not meet the criteria for a Cannabis use disorder.   Hallucinogen Use Disorder The patient does not meet the criteria for a Hallucinogen use disorder.   Inhalant Use Disorder The patient does not meet the criteria for an Inhalant use disorder.   Opioid Use Disorder The patient does not meet the criteria for an Opioid use disorder.   Sedative, Hypnotic, or Anxiolytic Use Disorder The patient does not meet the criteria for a Sedative/Hypnotic use disorder.   Stimulant Related Disorder The patient does not meet the criteria for a Stimulant use disorder.   Tobacco Use Disorder Severe   (F17.200) (305.1)    Other (or unknown) Substance Use Disorder The patient does not meet the criteria for a Other (or unknown) Substance use disorder.     Recommendations:     1. Plan for Safety and Risk Management:  Recommended that patient call 911 or go to the local ED should there be a change in any of these risk factors. Report to child / adult protection services was NA.     2. CONRADO Referrals:   Recommendations:    1)  Consider attending an Intensive Outpatient CD Treatment Program.  2)  Abstain from all mood-altering chemicals unless prescribed by a licensed provider.   3)  Attend, at minimum, 2 weekly support group meetings, such as 12 step based (AA/NA), SMART Recovery, Health Realizations, and/or Refuge Recovery meetings.     4)  Actively work with a male mentor/sponsor on a weekly basis.   5)  Follow all the recommendations of your treatment/medical providers.  6)  Remain law abiding and follow all recommendations of the Courts.  Patient reports they are willing to follow these recommendations.  Patient would like the following family or other support people involved in their treatment:  NA. Patient does not have a history of opiate use.      Clinical  Substantiation:  Pt reports a lot of his alcohol use was related to self medicating his anxiety. Now that his anxiety is controlled, he doesn't feel the need to drink.              Provider Name/ Credentials:  Taty Yanez MA [facility]  March 4, 2022

## 2022-03-04 NOTE — PROGRESS NOTES
[facility]  [address] [place], [place] [address]., [place] [address]        3/4/2022      Reg Nieves  [address] [place] [address]      Dear Mr. Nieves,    It was a pleasure meeting with you on 3/4/2022 for your Chemical Dependency Evaluation. Based on your evaluation, the recommendation is:  1)  Consider attending an Intensive Outpatient CD Treatment Program.  2)  Abstain from all mood-altering chemicals unless prescribed by a licensed provider.   3)  Attend, at minimum, 2 weekly support group meetings, such as 12 step based (AA/NA), SMART Recovery, Health Realizations, and/or Refuge Recovery meetings.     4)  Actively work with a male mentor/sponsor on a weekly basis.   5)  Follow all the recommendations of your treatment/medical providers.  6)  Remain law abiding and follow all recommendations of the Courts.    If I can be of further service, please don't hesitate to call.    Sincerely,    Toya Yanez MA [facility]  CD Evaluation Counselor  [phone]    (this letter was emailed to pt on 3/4/22)

## 2022-03-04 NOTE — TELEPHONE ENCOUNTER
3/4/2022  Pt completed his CONRADO CA today. His CONRADO CA was faxed to his  today.    [facility] Assessment ID: [id number]

## 2022-03-05 ASSESSMENT — ANXIETY QUESTIONNAIRES: GAD7 TOTAL SCORE: 3

## 2022-10-10 ENCOUNTER — HEALTH MAINTENANCE LETTER (OUTPATIENT)
Age: 37
End: 2022-10-10

## 2023-02-18 ENCOUNTER — HEALTH MAINTENANCE LETTER (OUTPATIENT)
Age: 38
End: 2023-02-18

## 2023-08-02 ENCOUNTER — VIRTUAL VISIT (OUTPATIENT)
Dept: FAMILY MEDICINE | Facility: CLINIC | Age: 38
End: 2023-08-02
Payer: COMMERCIAL

## 2023-08-02 DIAGNOSIS — F41.9 ANXIETY: ICD-10-CM

## 2023-08-02 PROCEDURE — 99213 OFFICE O/P EST LOW 20 MIN: CPT | Mod: VID | Performed by: FAMILY MEDICINE

## 2023-08-02 RX ORDER — ESCITALOPRAM OXALATE 20 MG/1
20 TABLET ORAL DAILY
Qty: 90 TABLET | Refills: 3 | Status: SHIPPED | OUTPATIENT
Start: 2023-08-02

## 2023-08-02 RX ORDER — HYDROXYZINE HYDROCHLORIDE 10 MG/1
10 TABLET, FILM COATED ORAL 3 TIMES DAILY PRN
Qty: 90 TABLET | Refills: 11 | Status: SHIPPED | OUTPATIENT
Start: 2023-08-02

## 2023-08-02 ASSESSMENT — ANXIETY QUESTIONNAIRES
6. BECOMING EASILY ANNOYED OR IRRITABLE: NEARLY EVERY DAY
GAD7 TOTAL SCORE: 21
2. NOT BEING ABLE TO STOP OR CONTROL WORRYING: NEARLY EVERY DAY
4. TROUBLE RELAXING: NEARLY EVERY DAY
IF YOU CHECKED OFF ANY PROBLEMS ON THIS QUESTIONNAIRE, HOW DIFFICULT HAVE THESE PROBLEMS MADE IT FOR YOU TO DO YOUR WORK, TAKE CARE OF THINGS AT HOME, OR GET ALONG WITH OTHER PEOPLE: NOT DIFFICULT AT ALL
3. WORRYING TOO MUCH ABOUT DIFFERENT THINGS: NEARLY EVERY DAY
7. FEELING AFRAID AS IF SOMETHING AWFUL MIGHT HAPPEN: NEARLY EVERY DAY
5. BEING SO RESTLESS THAT IT IS HARD TO SIT STILL: NEARLY EVERY DAY
1. FEELING NERVOUS, ANXIOUS, OR ON EDGE: NEARLY EVERY DAY
GAD7 TOTAL SCORE: 21

## 2023-08-02 ASSESSMENT — ENCOUNTER SYMPTOMS: NERVOUS/ANXIOUS: 1

## 2023-08-02 ASSESSMENT — PATIENT HEALTH QUESTIONNAIRE - PHQ9: SUM OF ALL RESPONSES TO PHQ QUESTIONS 1-9: 7

## 2023-08-02 NOTE — PROGRESS NOTES
Reg is a 37 year old who is being evaluated via a billable video visit.      How would you like to obtain your AVS? MyChart  If the video visit is dropped, the invitation should be resent by: Cell phone to cell phone.  Will anyone else be joining your video visit? No          Assessment & Plan   Patient is a 37 yr old male here for refills on his anxiety medication. Patient says the medication really helped his symptoms. He ran out of his medication so he has been off the medication for a few months. He denies any side effects to his medication.   (F41.9) Anxiety  Comment: medication faxed.   Plan: hydrOXYzine (ATARAX) 10 MG tablet, escitalopram        (LEXAPRO) 20 MG tablet                 FUTURE APPOINTMENTS:       - Follow-up visit in one month or sooner as needed.    Chucky Tsang MD  [facility]    Subjective   Reg is a 37 year old, presenting for the following health issues:  Anxiety (Recheck on anxiety medication.  He has been out of the medications for awhile and when he was taking them they did help his symptoms.  For the Lexapro he was taking 20 mg daily.  He would like to discuss about increasing that dose. )        8/2/2023     5:03 PM   Additional Questions   Roomed by Clemencia Alexis CMA     Chief Complaint   Patient presents with    Anxiety     Recheck on anxiety medication.  He has been out of the medications for awhile and when he was taking them they did help his symptoms.  For the Lexapro he was taking 20 mg daily.  He would like to discuss about increasing that dose.        History of Present Illness       Mental Health Follow-up:  Patient presents to follow-up on Depression & Anxiety.Patient's depression since last visit has been:  Medium  The patient is not having other symptoms associated with depression.  Patient's anxiety since last visit has been:  Worse  The patient is having other symptoms associated with anxiety.  Any significant life events:  No  Patient is feeling anxious or having panic attacks.  Patient has no concerns about alcohol or drug use.    He eats 0-1 servings of fruits and vegetables daily.He consumes 3 sweetened beverage(s) daily.He exercises with enough effort to increase his heart rate 9 or less minutes per day.  He exercises with enough effort to increase his heart rate 3 or less days per week.   He is taking medications regularly.               Review of Systems   Constitutional: Negative.    HENT: Negative.     Eyes: Negative.    Respiratory: Negative.     Endocrine: Negative.    Genitourinary: Negative.    Musculoskeletal: Negative.    Allergic/Immunologic: Negative.    Neurological: Negative.    Hematological: Negative.    Psychiatric/Behavioral:  Positive for mood changes. The patient is nervous/anxious.             Objective    Vitals - Patient Reported  Pain Score: No Pain (0)        Physical Exam   GENERAL: Healthy, alert and no distress  EYES: Eyes grossly normal to inspection.  No discharge or erythema, or obvious scleral/conjunctival abnormalities.  RESP: No audible wheeze, cough, or visible cyanosis.  No visible retractions or increased work of breathing.    SKIN: Visible skin clear. No significant rash, abnormal pigmentation or lesions.  PSYCH: Mentation appears normal, affect normal/bright, judgement and insight intact, normal speech and appearance well-groomed.        Video-Visit Details    Type of service:  Video Visit       Distant Location (provider location):  Off-site  Platform used for Video Visit: Tiffany

## 2023-08-04 ASSESSMENT — ENCOUNTER SYMPTOMS
MUSCULOSKELETAL NEGATIVE: 1
RESPIRATORY NEGATIVE: 1
CONSTITUTIONAL NEGATIVE: 1
EYES NEGATIVE: 1
NEUROLOGICAL NEGATIVE: 1
ENDOCRINE NEGATIVE: 1
ALLERGIC/IMMUNOLOGIC NEGATIVE: 1
HEMATOLOGIC/LYMPHATIC NEGATIVE: 1

## 2024-03-16 ENCOUNTER — HEALTH MAINTENANCE LETTER (OUTPATIENT)
Age: 39
End: 2024-03-16

## 2025-03-22 ENCOUNTER — HEALTH MAINTENANCE LETTER (OUTPATIENT)
Age: 40
End: 2025-03-22